# Patient Record
Sex: MALE | Race: WHITE | NOT HISPANIC OR LATINO | Employment: FULL TIME | URBAN - METROPOLITAN AREA
[De-identification: names, ages, dates, MRNs, and addresses within clinical notes are randomized per-mention and may not be internally consistent; named-entity substitution may affect disease eponyms.]

---

## 2018-04-09 ENCOUNTER — HOSPITAL ENCOUNTER (EMERGENCY)
Facility: MEDICAL CENTER | Age: 33
End: 2018-04-09

## 2018-04-09 VITALS
OXYGEN SATURATION: 97 % | HEART RATE: 116 BPM | BODY MASS INDEX: 35.56 KG/M2 | RESPIRATION RATE: 16 BRPM | HEIGHT: 69 IN | TEMPERATURE: 97 F | DIASTOLIC BLOOD PRESSURE: 96 MMHG | WEIGHT: 240.08 LBS | SYSTOLIC BLOOD PRESSURE: 123 MMHG

## 2018-04-09 PROCEDURE — 302449 STATCHG TRIAGE ONLY (STATISTIC)

## 2018-04-09 ASSESSMENT — PAIN SCALES - GENERAL: PAINLEVEL_OUTOF10: 0

## 2018-04-09 NOTE — ED TRIAGE NOTES
"Ambulatory to triage with c/o   Chief Complaint   Patient presents with   • Jaw Pain     \"stiff jaw\" Denies trauma. States its stiff while chewing slim jims   Anxious. Requesting a tetanus vaccine for possible infection with shaving razor. States last tetanus vaccine was 2 years ago.     "

## 2018-04-27 ENCOUNTER — HOSPITAL ENCOUNTER (EMERGENCY)
Facility: MEDICAL CENTER | Age: 33
End: 2018-04-27
Attending: EMERGENCY MEDICINE

## 2018-04-27 VITALS
SYSTOLIC BLOOD PRESSURE: 145 MMHG | TEMPERATURE: 98 F | DIASTOLIC BLOOD PRESSURE: 86 MMHG | HEART RATE: 98 BPM | OXYGEN SATURATION: 99 % | HEIGHT: 69 IN | RESPIRATION RATE: 16 BRPM | WEIGHT: 245.15 LBS | BODY MASS INDEX: 36.31 KG/M2

## 2018-04-27 DIAGNOSIS — R36.1 BLOOD IN SEMEN: ICD-10-CM

## 2018-04-27 LAB
C TRACH DNA SPEC QL NAA+PROBE: NEGATIVE
N GONORRHOEA DNA SPEC QL NAA+PROBE: NEGATIVE
SPECIMEN SOURCE: NORMAL

## 2018-04-27 PROCEDURE — 99283 EMERGENCY DEPT VISIT LOW MDM: CPT

## 2018-04-27 PROCEDURE — 87491 CHLMYD TRACH DNA AMP PROBE: CPT

## 2018-04-27 PROCEDURE — 87591 N.GONORRHOEAE DNA AMP PROB: CPT

## 2018-04-27 ASSESSMENT — LIFESTYLE VARIABLES: DO YOU DRINK ALCOHOL: NO

## 2018-04-27 NOTE — DISCHARGE INSTRUCTIONS
Please follow-up with your primary care provider for blood pressure management.    Epididymitis  Introduction  Epididymitis is swelling (inflammation) of the epididymis. The epididymis is a cord-like structure that is located along the top and back part of the testicle. It collects and stores sperm from the testicle.  This condition can also cause pain and swelling of the testicle and scrotum. Symptoms usually start suddenly (acute epididymitis). Sometimes epididymitis starts gradually and lasts for a while (chronic epididymitis). This type may be harder to treat.  What are the causes?  In men 35 and younger, this condition is usually caused by a bacterial infection or sexually transmitted disease (STD), such as:  · Gonorrhea.  · Chlamydia.  In men 35 and older who do not have anal sex, this condition is usually caused by bacteria from a blockage or abnormalities in the urinary system. These can result from:  · Having a tube placed into the bladder (urinary catheter).  · Having an enlarged or inflamed prostate gland.  · Having recent urinary tract surgery.  In men who have a condition that weakens the body’s defense system (immune system), such as HIV, this condition can be caused by:  · Other bacteria, including tuberculosis and syphilis.  · Viruses.  · Fungi.  Sometimes this condition occurs without infection. That may happen if urine flows backward into the epididymis after heavy lifting or straining.  What increases the risk?  This condition is more likely to develop in men:  · Who have unprotected sex with more than one partner.  · Who have anal sex.  · Who have recently had surgery.  · Who have a urinary catheter.  · Who have urinary problems.  · Who have a suppressed immune system.  What are the signs or symptoms?  This condition usually begins suddenly with chills, fever, and pain behind the scrotum and in the testicle. Other symptoms include:  · Swelling of the scrotum, testicle, or both.  · Pain  when ejaculating or urinating.  · Pain in the back or belly.  · Nausea.  · Itching and discharge from the penis.  · Frequent need to pass urine.  · Redness and tenderness of the scrotum.  How is this diagnosed?  Your health care provider can diagnose this condition based on your symptoms and medical history. Your health care provider will also do a physical exam to ask about your symptoms and check your scrotum and testicle for swelling, pain, and redness. You may also have other tests, including:  · Examination of discharge from the penis.  · Urine tests for infections, such as STDs.  Your health care provider may test you for other STDs, including HIV.  How is this treated?  Treatment for this condition depends on the cause. If your condition is caused by a bacterial infection, oral antibiotic medicine may be prescribed. If the bacterial infection has spread to your blood, you may need to receive IV antibiotics. Nonbacterial epididymitis is treated with home care that includes bed rest and elevation of the scrotum.  Surgery may be needed to treat:  · Bacterial epididymitis that causes pus to build up in the scrotum (abscess).  · Chronic epididymitis that has not responded to other treatments.  Follow these instructions at home:  Medicines  · Take over-the-counter and prescription medicines only as told by your health care provider.  · If you were prescribed an antibiotic medicine, take it as told by your health care provider. Do not stop taking the antibiotic even if your condition improves.  Sexual Activity  · If your epididymitis was caused by an STD, avoid sexual activity until your treatment is complete.  · Inform your sexual partner or partners if you test positive for an STD. They may need to be treated. Do not engage in sexual activity with your partner or partners until their treatment is completed.  General instructions  · Return to your normal activities as told by your health care provider. Ask your  health care provider what activities are safe for you.  · Keep your scrotum elevated and supported while resting. Ask your health care provider if you should wear a scrotal support, such as a jockstrap. Wear it as told by your health care provider.  · If directed, apply ice to the affected area:  ¨ Put ice in a plastic bag.  ¨ Place a towel between your skin and the bag.  ¨ Leave the ice on for 20 minutes, 2-3 times per day.  · Try taking a sitz bath to help with discomfort. This is a warm water bath that is taken while you are sitting down. The water should only come up to your hips and should cover your buttocks. Do this 3-4 times per day or as told by your health care provider.  · Keep all follow-up visits as told by your health care provider. This is important.  Contact a health care provider if:  · You have a fever.  · Your pain medicine is not helping.  · Your pain is getting worse.  · Your symptoms do not improve within three days.  This information is not intended to replace advice given to you by your health care provider. Make sure you discuss any questions you have with your health care provider.  Document Released: 12/15/2001 Document Revised: 05/25/2017 Document Reviewed: 05/04/2016  © 2017 [employer]

## 2018-04-27 NOTE — ED PROVIDER NOTES
"ED Provider Note    Scribed for Randall Bennett M.D. by Mike Rendon. 4/27/2018  11:17 AM    Primary care provider: No primary care provider noted.  Means of arrival: Walk in  History obtained from: Patient  History limited by: None    CHIEF COMPLAINT  Chief Complaint   Patient presents with   • Other     Pt was masturbating and stated that his semen was red tinged.         HPI  Danny Rios is a 33 y.o. male who presents to the Emergency Department for sudden \"red-tinged\" semen noticed today. He describes the amount as a little bit and reports no similar symptoms previously. He has no associated symptoms. He was recently tested for STDs and states the results were negative. No complaints of penis pain, nausea, vomiting, fever, dysuria, drainage, abdominal pain, or swelling at this time.     REVIEW OF SYSTEMS  Pertinent positives include red tinged semen. Pertinent negatives include no penis pain, nausea, vomiting, fever, dysuria,  drainage, abdominal pain, or swelling. E.     PAST MEDICAL HISTORY   None noted     SURGICAL HISTORY  patient denies any surgical history    SOCIAL HISTORY  Social History   Substance Use Topics   • Smoking status: Never Smoker   • Smokeless tobacco: Never Used      History   Drug use: Unknown       FAMILY HISTORY  History reviewed. No pertinent family history.    CURRENT MEDICATIONS  No current facility-administered medications on file prior to encounter.      No current outpatient prescriptions on file prior to encounter.      ALLERGIES  No Known Allergies    PHYSICAL EXAM  VITAL SIGNS: /99   Pulse (!) 106   Temp 36.7 °C (98 °F)   Resp 16   Ht 1.753 m (5' 9\")   Wt 111.2 kg (245 lb 2.4 oz)   SpO2 98%   BMI 36.20 kg/m²     Constitutional: Well developed, Well nourished, mild distress, Non-toxic appearance.   HENT: Normocephalic, Atraumatic.  Oropharynx moist.   Eyes: PERRL, EOMI, Conjunctiva normal, No discharge.   CV: Good pulses  Thorax & Lungs: No respiratory distress. "   : External genitalia appear normal, No masses or lesions. No discharge at the urethral meatus. Testicles appear normal without masses.  Skin: Warm, Dry, No erythema, No rash.    Musculoskeletal: No major deformities noted.   Neurologic: Awake, alert. Moves all extremities spontaneously.  Psychiatric: Affect normal, Mood normal.    LABS  Labs Reviewed   CHLAMYDIA/GC PCR URINE OR SWAB     All labs reviewed by me.    COURSE & MEDICAL DECISION MAKING  Nursing notes, VS, PMSFHx reviewed in chart.    11:17 AM - Patient seen and examined at bedside. Ordered chlamydia and GC to evaluate his symptoms. Discussed with the patient I will send his specimen out for testing and will give him contact for a urologist to follow up with. Patient agrees with plan of care.     Decision Making:  Patient with some blood in his semen likely secondary to masturbating or some minimal trauma, we'll check for an STD, have the patient follow up with urology, have the patient return with any other concerns.    The patient will return for new or worsening symptoms and is stable at the time of discharge.    The patient is referred to a primary physician for blood pressure management, diabetic screening, and for all other preventative health concerns.    DISPOSITION:  Patient will be discharged home in stable condition.    FOLLOW UP:  [facility], Emergency Dept  [address] [address] [phone]    If symptoms worsen      FINAL IMPRESSION  1. Blood in semen          Mike GARCIA (Scribshanice), am scribing for, and in the presence of, Randall Bennett M.D..    Electronically signed by: Mike Bellamy), 4/27/2018    Randall GARCIA M.D. personally performed the services described in this documentation, as scribed by Mike Rendon in my presence, and it is both accurate and complete.    The note accurately reflects work and decisions made by me.  Randall Bennett  4/27/2018  6:34 PM

## 2018-04-27 NOTE — ED TRIAGE NOTES
"Pt ambulates to triage  Chief Complaint   Patient presents with   • Other     Pt was masturbating and stated that his semen was red tinged.     concerned for STD.  Reports no sexual activity for 'over a year\"    "

## 2020-03-29 ENCOUNTER — HOSPITAL ENCOUNTER (EMERGENCY)
Facility: MEDICAL CENTER | Age: 35
End: 2020-03-29
Attending: EMERGENCY MEDICINE
Payer: MEDICAID

## 2020-03-29 VITALS
TEMPERATURE: 97.9 F | OXYGEN SATURATION: 95 % | BODY MASS INDEX: 38.09 KG/M2 | DIASTOLIC BLOOD PRESSURE: 96 MMHG | WEIGHT: 272.05 LBS | RESPIRATION RATE: 16 BRPM | SYSTOLIC BLOOD PRESSURE: 150 MMHG | HEART RATE: 118 BPM | HEIGHT: 71 IN

## 2020-03-29 DIAGNOSIS — T30.0 SUPERFICIAL BURN: ICD-10-CM

## 2020-03-29 PROCEDURE — 99281 EMR DPT VST MAYX REQ PHY/QHP: CPT
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
## 2020-04-03 ENCOUNTER — HOSPITAL ENCOUNTER (EMERGENCY)
Facility: MEDICAL CENTER | Age: 35
End: 2020-04-03
Attending: EMERGENCY MEDICINE
Payer: MEDICAID

## 2020-04-03 ENCOUNTER — APPOINTMENT (OUTPATIENT)
Dept: RADIOLOGY | Facility: MEDICAL CENTER | Age: 35
End: 2020-04-03
Attending: EMERGENCY MEDICINE
Payer: MEDICAID

## 2020-04-03 VITALS
SYSTOLIC BLOOD PRESSURE: 144 MMHG | TEMPERATURE: 98.2 F | WEIGHT: 262.35 LBS | BODY MASS INDEX: 36.73 KG/M2 | HEART RATE: 94 BPM | DIASTOLIC BLOOD PRESSURE: 97 MMHG | OXYGEN SATURATION: 99 % | RESPIRATION RATE: 14 BRPM | HEIGHT: 71 IN

## 2020-04-03 DIAGNOSIS — R10.31 RIGHT INGUINAL PAIN: ICD-10-CM

## 2020-04-03 LAB
APPEARANCE UR: CLEAR
BILIRUB UR QL STRIP.AUTO: NEGATIVE
COLOR UR: YELLOW
GLUCOSE UR STRIP.AUTO-MCNC: NEGATIVE MG/DL
KETONES UR STRIP.AUTO-MCNC: NEGATIVE MG/DL
LEUKOCYTE ESTERASE UR QL STRIP.AUTO: NEGATIVE
MICRO URNS: NORMAL
NITRITE UR QL STRIP.AUTO: NEGATIVE
PH UR STRIP.AUTO: 5.5 [PH] (ref 5–8)
PROT UR QL STRIP: NEGATIVE MG/DL
RBC UR QL AUTO: NEGATIVE
SP GR UR STRIP.AUTO: 1.03
UROBILINOGEN UR STRIP.AUTO-MCNC: 1 MG/DL

## 2020-04-03 PROCEDURE — 81003 URINALYSIS AUTO W/O SCOPE: CPT

## 2020-04-03 PROCEDURE — 76870 US EXAM SCROTUM: CPT

## 2020-04-03 PROCEDURE — 87491 CHLMYD TRACH DNA AMP PROBE: CPT

## 2020-04-03 PROCEDURE — 87591 N.GONORRHOEAE DNA AMP PROB: CPT

## 2020-04-03 PROCEDURE — 99283 EMERGENCY DEPT VISIT LOW MDM: CPT

## 2020-04-03 NOTE — ED PROVIDER NOTES
"ED Provider Note    Scribed for Randall Bennett M.D. by Deb Arndt. 4/3/2020  2:01 PM    Primary care provider: Pcp Pt States None  Means of arrival: Walk in  History obtained from: Patient  History limited by: None    CHIEF COMPLAINT  Chief Complaint   Patient presents with   • Painful Urination     cloudy urine, foul odor, urethral irritation       HPI  Danny Rios is a 34 y.o. male who presents to the Emergency Department for ongoing right penile pain that began one week ago. He describes that his \"urethra is irritated\" in quality. Per nursing note, he states that his is urine cloudy, and odorous. Exacerbating factors include eating. No alleviating factors were identified. Patient was tested for STI several years ago and was negative at that time. He is sexually active with one female partner since the last time he was tested. He reports associated nausea. He denies any associated dysuria, vomiting, hematuria, urinary retention, penile discharge, fever, or cough.    REVIEW OF SYSTEMS  Pertinent positives include urethral pain, and nausea. Pertinent negatives include no dysuria, vomiting, hematuria, urinary retention, penile discharge, fever, or cough.  All other systems reviewed and negative.    PAST MEDICAL HISTORY   Denies.     SURGICAL HISTORY  patient denies any surgical history    SOCIAL HISTORY  Social History     Tobacco Use   • Smoking status: Never Smoker   • Smokeless tobacco: Never Used   Substance Use Topics   • Alcohol use: None noted.   • Drug use: None noted.      Social History     Substance and Sexual Activity   Drug Use None noted.       FAMILY HISTORY  No family history on file.    CURRENT MEDICATIONS  Home Medications     Reviewed by Jessie Santiago R.N. (Registered Nurse) on 04/03/20 at 1348  Med List Status: Complete   Medication Last Dose Status        Patient Ryan Taking any Medications                       ALLERGIES  No Known Allergies    PHYSICAL EXAM  VITAL SIGNS: /109  " " Pulse (!) 112   Temp 36.8 °C (98.2 °F) (Temporal)   Resp 16   Ht 1.803 m (5' 11\")   Wt 119 kg (262 lb 5.6 oz)   SpO2 98%   BMI 36.59 kg/m²     Constitutional: Well developed, Well nourished, No distress, Non-toxic appearance.   HENT: Normocephalic, Atraumatic, Bilateral external ears normal, Oropharynx moist, No oral exudates.   Eyes: PERRLA, EOMI, Conjunctiva normal, No discharge.   Neck: No tenderness, Supple, No stridor.   Lymphatic: No lymphadenopathy noted.   Cardiovascular: Normal heart rate, Normal rhythm.   Thorax & Lungs: Clear to auscultation bilaterally, No respiratory distress, No wheezing, No crackles.   Abdomen: Soft, No tenderness, No masses, No pulsatile masses.   : Tenderness to palpation at the right base of the penis, no swelling or erythema, no palpable hernia, no discharge from urethral meatus.   Skin: Warm, Dry, No erythema, No rash.   Extremities:, No edema No cyanosis.   Musculoskeletal: No tenderness to palpation or major deformities noted.  Intact distal pulses  Neurologic: Awake, alert. Moves all extremities spontaneously.  Psychiatric: Affect normal, Judgment normal, Mood normal.     LABS  Results for orders placed or performed during the hospital encounter of 04/03/20   URINALYSIS CULTURE, IF INDICATED   Result Value Ref Range    Color Yellow     Character Clear     Specific Gravity 1.030 <1.035    Ph 5.5 5.0 - 8.0    Glucose Negative Negative mg/dL    Ketones Negative Negative mg/dL    Protein Negative Negative mg/dL    Bilirubin Negative Negative    Urobilinogen, Urine 1.0 Negative    Nitrite Negative Negative    Leukocyte Esterase Negative Negative    Occult Blood Negative Negative    Micro Urine Req see below    CHLAMYDIA & GC BY PCR   Result Value Ref Range    Source Urine         RADIOLOGY  [id number]   Final Result      Normal scrotum ultrasound.      No testicular mass or increased testicular flow.        The radiologist's interpretation of all radiological " studies have been reviewed by me.      COURSE & MEDICAL DECISION MAKING  Pertinent Labs & Imaging studies reviewed. (See chart for details)    I reviewed the patient's medical records which showed no recent pertinent visits.    2:16 PM - Patient seen and examined at bedside. Discussed plan of care with patient. I informed them that labs and imaging will be ordered to evaluate symptoms. Patient is understanding and agreeable with plan. Ordered Chlamydia and GC, UA, and US scrotum to evaluate his symptoms. The differential diagnoses include but are not limited to: STI, UTI, testicular torsion.  Inguinal strain    3:17 PM - Patient was reevaluated at bedside. He is resting comfortably in bed with no new complaints at this time. Discussed lab and radiology results with the patient and informed them that they were reassuring.The patient will return for new or worsening symptoms and is stable at the time of discharge.The patient is referred to a primary physician for blood pressure management, diabetic screening, and for all other preventative health concerns. He is understanding and agreeable to discharge home.    Decision Making:  Patient is coming in secondary to pain at the base of the right side of his penis.  No soft tissue swelling, no erythema, no hernia.  Ultrasound was negative, urinalysis is negative.  This point time I do not know to the etiology of the patient's symptoms.  Believe the like Hair could be either inflammatory from lymph nodes or a strain.  Discussed with him to take anti-inflammatories, have the patient return with worsening symptoms.      DISPOSITION:  Patient will be discharged home in stable condition.    FOLLOW UP:  No follow-up provider specified.    OUTPATIENT MEDICATIONS:  New Prescriptions    No medications on file         FINAL IMPRESSION  No diagnosis found.      I, Deb Arndt (Scribe), am scribing for, and in the presence of, Randall Bennett M.D..    Electronically signed by:  Deb Arndt (Scribe), 4/3/2020    I, Randall Bennett M.D. personally performed the services described in this documentation, as scribed by Deb Arndt in my presence, and it is both accurate and complete.    C    The note accurately reflects work and decisions made by me.  Randall Bennett M.D.  4/3/2020  3:34 PM

## 2020-04-03 NOTE — ED TRIAGE NOTES
Pt amb to triage. Pt denies cough, fever or respiratory symptoms.  Chief Complaint   Patient presents with   • Painful Urination     cloudy urine, foul odor, urethral irritation     Symptoms x1wk.  Pt given urine collection supplies. Instructed on clean catch technique.

## 2020-12-30 ENCOUNTER — HOSPITAL ENCOUNTER (EMERGENCY)
Facility: MEDICAL CENTER | Age: 35
End: 2020-12-30
Attending: EMERGENCY MEDICINE
Payer: MEDICAID

## 2020-12-30 VITALS
HEART RATE: 84 BPM | HEIGHT: 69 IN | DIASTOLIC BLOOD PRESSURE: 97 MMHG | SYSTOLIC BLOOD PRESSURE: 152 MMHG | WEIGHT: 273.37 LBS | TEMPERATURE: 98.3 F | RESPIRATION RATE: 18 BRPM | BODY MASS INDEX: 40.49 KG/M2 | OXYGEN SATURATION: 97 %

## 2020-12-30 DIAGNOSIS — R03.0 ELEVATED BLOOD PRESSURE READING: ICD-10-CM

## 2020-12-30 DIAGNOSIS — K04.7 DENTAL ABSCESS: ICD-10-CM

## 2020-12-30 PROCEDURE — 99282 EMERGENCY DEPT VISIT SF MDM: CPT | Mod: EDC

## 2020-12-30 RX ORDER — AMOXICILLIN 500 MG/1
500 CAPSULE ORAL 3 TIMES DAILY
Qty: 30 CAP | Refills: 0 | Status: SHIPPED | OUTPATIENT
Start: 2020-12-30

## 2020-12-30 NOTE — DISCHARGE INSTRUCTIONS
Take amoxicillin as prescribed 3 times daily for 10 days for likely dental infection    Take Tylenol 650 mg every 4 hours as needed for pain    Take ibuprofen 600 mg every 6 hours with food as needed for pain    Return to the ER for fever, chills, difficulty swallowing or opening your mouth, difficulty breathing, increased swelling or pain    Call the [facility] for dental care    Call Dr. Woods at [phone].  He sees patients on an emergency basis to evaluate for dental extractions and oral abscesses.    Call the [place] [facility] at [phone]    Call smile restorer on [facility] for dental care

## 2020-12-30 NOTE — ED PROVIDER NOTES
"ED Provider Note    CHIEF COMPLAINT  Chief Complaint   Patient presents with   • Oral Swelling     L upper. since today       HPI  Danny Rios is a 35 y.o. male who presents complaining of left-sided facial swelling today.    Patient states he had a dull/mild tooth ache a couple of weeks ago in the left upper area.  Today he woke up with left-sided facial swelling.  He states it has improved since he first noticed it.  He denies any dental pain, fever, chills, trauma, visual changes, difficulty opening his mouth or swallowing/breathing.    Patient states he does not have a dentist currently but requests a referral to 1 as well as a note for work for today.      ALLERGIES  No Known Allergies    CURRENT MEDICATIONS  Home Medications     Reviewed by Schuyler B Collett, R.N. (Registered Nurse) on 12/30/20 at 0953  Med List Status: <None>   Medication Last Dose Status        Patient Ryan Taking any Medications                       PAST MEDICAL HISTORY     Denies    SURGICAL HISTORY  patient denies any surgical history    SOCIAL HISTORY  Social History     Tobacco Use   • Smoking status: Never Smoker   • Smokeless tobacco: Never Used   Substance and Sexual Activity   • Alcohol use: Not on file   • Drug use: Not on file   • Sexual activity: Not on file     Here with his significant other      REVIEW OF SYSTEMS  Patient admits to left-sided facial swelling   pt denies fever, chills, significant dental pain, trauma, visual changes, history of diabetes  See HPI for further details.       PHYSICAL EXAM  VITAL SIGNS: BP (!) 164/117   Pulse 91   Temp 36.9 °C (98.4 °F) (Temporal)   Resp 20   Ht 1.753 m (5' 9\")   Wt 124 kg (273 lb 5.9 oz)   SpO2 97%   BMI 40.37 kg/m²     General:  WD obese male, nontoxic appearing in NAD; A+Ox3; V/S as above; afebrile  Skin: warm and dry; good color; no rash  HEENT: NCAT; EOMs intact; PERRL; no scleral icterus; mild left-sided facial swelling without erythema or tenderness; no trismus, " no drooling, no uvular deviation; there are no sublingual lesions  Neck: FROM; no meningismus, no LAD  Extremities: REHMAN x 4; no e/o trauma; no pedal edema  Neurologic: CNs III-XII grossly intact; speech clear  Psychiatric: Appropriate affect, normal mood    MEDICAL RECORD  I have reviewed the patient's medical record and pertinent results as listed.      COURSE & MEDICAL DECISION MAKING  I have reviewed any medical record information, laboratory studies and radiographic results as noted.    Appropriate PPE was worn at all times while interacting with the patient, including goggles, N95 mask, and surgical mask.    Danny Rios is a 35 y.o. male who presents complaining of left-sided facial swelling.  The patient denies dental pain but localizes the issues stemming from a particular tooth in the left upper region..  There is no evidence of airway obstruction or intraoral abscess.  I do not feel that imaging is indicated today.  We will treat with amoxicillin and I recommended ibuprofen.  Patient declines narcotic pain medication.  Return precautions were discussed.    Pt's blood pressure was noted to be above 120/80 here in the ER.  Pt was informed and advised to follow up as an outpatient for recheck for possible dx/management of hypertension.    FINAL IMPRESSION  1. Dental abscess     2. Elevated blood pressure reading              Electronically signed by: Sandi Harris M.D., 12/30/2020 10:28 AM

## 2020-12-30 NOTE — ED NOTES
"Educated patient on discharge instructions, rx medications and follow up with PCP and dentist, [facility], Emergency Dept  [address] [address] [phone]    As needed, If symptoms worsen    [address] [address] [phone]  Schedule an appointment as soon as possible for a visit in 1 day      ; voiced understanding rec'vd. VS stable, /97   Pulse 84   Temp 36.8 °C (98.3 °F) (Temporal)   Resp 18   Ht 1.753 m (5' 9\")   Wt 124 kg (273 lb 5.9 oz)   SpO2 97%   BMI 40.37 kg/m²    Patient alert and appropriate. Skin PWD. NAD. All questions and concerns addressed. No further questions or concerns at this time. Copy of discharge paperwork provided.  Patient out of department with wife in stable condition. Work note provided.    "

## 2020-12-30 NOTE — LETTER
Emergency Services     December 30, 2020    Patient: Danny Rios   YOB: 1985   Date of Visit: 12/30/2020       To Whom It May Concern:    Danny Rios was seen and treated in our emergency department on 12/30/2020.    Sincerely,     TONIO NUNEZ M.D.  [facility], EMERGENCY DEPT  Dept: [phone]

## 2020-12-30 NOTE — ED TRIAGE NOTES
Danny Rios presents to triage, wearing a mask, reporting:  Chief Complaint   Patient presents with   • Oral Swelling     L upper. since today   Poor dentition upon assessment, moderate swelling noted.     Pt denies any respiratory or flu like symptoms at this time.    Pt speaking in full sentences, NAD noted. Pt educated of triage process, placed back in waiting area pending room assignment.

## 2021-02-07 ENCOUNTER — HOSPITAL ENCOUNTER (EMERGENCY)
Facility: MEDICAL CENTER | Age: 36
End: 2021-02-07
Attending: EMERGENCY MEDICINE
Payer: MEDICAID

## 2021-02-07 VITALS
TEMPERATURE: 97 F | RESPIRATION RATE: 18 BRPM | SYSTOLIC BLOOD PRESSURE: 140 MMHG | WEIGHT: 276.9 LBS | HEIGHT: 70 IN | HEART RATE: 86 BPM | BODY MASS INDEX: 39.64 KG/M2 | DIASTOLIC BLOOD PRESSURE: 96 MMHG | OXYGEN SATURATION: 93 %

## 2021-02-07 DIAGNOSIS — R11.2 NON-INTRACTABLE VOMITING WITH NAUSEA, UNSPECIFIED VOMITING TYPE: ICD-10-CM

## 2021-02-07 PROCEDURE — A9270 NON-COVERED ITEM OR SERVICE: HCPCS | Performed by: EMERGENCY MEDICINE

## 2021-02-07 PROCEDURE — 99283 EMERGENCY DEPT VISIT LOW MDM: CPT

## 2021-02-07 PROCEDURE — 700102 HCHG RX REV CODE 250 W/ 637 OVERRIDE(OP): Performed by: EMERGENCY MEDICINE

## 2021-02-07 PROCEDURE — 700111 HCHG RX REV CODE 636 W/ 250 OVERRIDE (IP): Performed by: EMERGENCY MEDICINE

## 2021-02-07 RX ORDER — ONDANSETRON 4 MG/1
4 TABLET, ORALLY DISINTEGRATING ORAL ONCE
Status: COMPLETED | OUTPATIENT
Start: 2021-02-07 | End: 2021-02-07

## 2021-02-07 RX ORDER — PROMETHAZINE HYDROCHLORIDE 25 MG/1
25 TABLET ORAL EVERY 6 HOURS PRN
Qty: 30 TAB | Refills: 0 | Status: SHIPPED | OUTPATIENT
Start: 2021-02-07

## 2021-02-07 RX ORDER — OMEPRAZOLE 40 MG/1
40 CAPSULE, DELAYED RELEASE ORAL DAILY
Qty: 30 CAP | Refills: 0 | Status: SHIPPED | OUTPATIENT
Start: 2021-02-07

## 2021-02-07 RX ADMIN — ONDANSETRON 4 MG: 4 TABLET, ORALLY DISINTEGRATING ORAL at 08:45

## 2021-02-07 RX ADMIN — LIDOCAINE HYDROCHLORIDE 30 ML: 20 SOLUTION OROPHARYNGEAL at 08:45

## 2021-02-07 SDOH — HEALTH STABILITY: MENTAL HEALTH: HOW OFTEN DO YOU HAVE A DRINK CONTAINING ALCOHOL?: MONTHLY OR LESS

## 2021-02-07 SDOH — HEALTH STABILITY: MENTAL HEALTH: HOW MANY STANDARD DRINKS CONTAINING ALCOHOL DO YOU HAVE ON A TYPICAL DAY?: 1 OR 2

## 2021-02-07 NOTE — ED TRIAGE NOTES
Danny Rios  35 y.o. male  Chief Complaint   Patient presents with   • N/V     Patient reported that he had a new onset of vomiting this morning associated with no other symptoms. Patient noted to be hypertensive in triage, initial /119. Repeat /103 on left arm, HR 95. Patient states he has been told he has high blood pressure but he does not take anything for it.

## 2021-02-07 NOTE — ED PROVIDER NOTES
ED Provider Note    CHIEF COMPLAINT  Chief Complaint   Patient presents with   • N/V       HPI  Danny Rios is a 35 y.o. male who presents with nausea and vomiting.  Patient states he woke up this morning with nausea and had one episode of emesis.  He states his been under a lot of stress as he is trying to pay late bills and care for his family.  He denies marijuana use.  Does not have any current abdominal pain.  He is unaware of any fevers.  He is aware that he has high blood pressure is not currently taking medication.  Is not had any chest pain or difficulty with breathing.    REVIEW OF SYSTEMS  See HPI for further details. All other systems are negative.     PAST MEDICAL HISTORY  No past medical history on file.    F[facility] HISTORY  [email or web address]    SOCIAL HISTORY  Social History     Socioeconomic History   • Marital status:      Spouse name: Not on file   • Number of children: Not on file   • Years of education: Not on file   • Highest education level: Not on file   Occupational History   • Not on file   Social Needs   • Financial resource strain: Not on file   • Food insecurity     Worry: Not on file     Inability: Not on file   • Transportation needs     Medical: Not on file     Non-medical: Not on file   Tobacco Use   • Smoking status: Never Smoker   • Smokeless tobacco: Never Used   Substance and Sexual Activity   • Alcohol use: Yes     Frequency: Monthly or less     Drinks per session: 1 or 2   • Drug use: Never   • Sexual activity: Not on file   Lifestyle   • Physical activity     Days per week: Not on file     Minutes per session: Not on file   • Stress: Not on file   Relationships   • Social connections     Talks on phone: Not on file     Gets together: Not on file     Attends [employer] service: Not on file     Active member of club or organization: Not on file     Attends meetings of clubs or organizations: Not on file     Relationship status: Not on file   • Intimate partner violence     Fear of  "current or ex partner: Not on file     Emotionally abused: Not on file     Physically abused: Not on file     Forced sexual activity: Not on file   Other Topics Concern   • Not on file   Social History Narrative   • Not on file       SURGICAL HISTORY  No past surgical history on file.    CURRENT MEDICATIONS  Home Medications     Reviewed by Inez Moran R.N. (Registered Nurse) on 02/07/21 at 0801  Med List Status: Not Addressed   Medication Last Dose Status   amoxicillin (AMOXIL) 500 MG Cap  Active                ALLERGIES  No Known Allergies    PHYSICAL EXAM  VITAL SIGNS: BP (!) 161/103   Pulse 95   Temp 36.1 °C (97 °F) (Temporal)   Resp 18   Ht 1.778 m (5' 10\")   Wt (!) 126 kg (276 lb 14.4 oz)   SpO2 97%   BMI 39.73 kg/m²       Constitutional: Well developed, Well nourished, No acute distress, Non-toxic appearance.   HENT: Normocephalic, Atraumatic, Bilateral external ears normal, Oropharynx moist, No oral exudates, Nose normal.   Eyes: PERRLA, EOMI, Conjunctiva normal, No discharge.   Neck: Normal range of motion, No tenderness, Supple, No stridor.   Lymphatic: No lymphadenopathy noted.   Cardiovascular: Normal heart rate, Normal rhythm, No murmurs, No rubs, No gallops.   Thorax & Lungs: Normal breath sounds, No respiratory distress, No wheezing, No chest tenderness.   Abdomen: Bowel sounds normal, Soft, No tenderness, No masses, No pulsatile masses.   Skin: Warm, Dry, No erythema, No rash.   Back: No tenderness, No CVA tenderness.   Extremities: Intact distal pulses, No edema, No tenderness, No cyanosis, No clubbing.   Neurologic: Alert & oriented x 3, Normal motor function, Normal sensory function, No focal deficits noted.   Psychiatric: Affect normal, Judgment normal, Mood normal.     COURSE & MEDICAL DECISION MAKING  Pertinent Labs & Imaging studies reviewed. (See chart for details)  This a 35-year-old gentleman who presents with nausea.  I suspect this is all from stress induced gastritis.  His " abdomen is benign.  He is hypertensive but does not show any clinical evidence of endorgan dysfunction.  The patient will receive Zofran and a GI cocktail for acute management in the emergency department.  I will discharge the patient home on Phenergan and Prilosec.  We will also contact the  to help the patient establish a primary care physician.  Discussed with the patient the need for follow-up for possible blood pressure management.  The patient will return to the emerge department if he is acutely worse.    FINAL IMPRESSION  1.  Gastritis  2.  Hypertension    Disposition  Patient will be discharged in stable condition         Electronically signed by: Corey Heard M.D., 2/7/2021 8:39 AM

## 2021-02-12 NOTE — ED NOTES
Patient reports pain to a tooth on the left upper side for weeks, but has somewhat resolved. Edema to left facial cheek starting this morning. No drainage reported. Gown provided. Chart up for ERP.    12-Feb-2021